# Patient Record
(demographics unavailable — no encounter records)

---

## 2024-11-12 NOTE — CARDIOLOGY SUMMARY
[TextEntry] : EKG 5/9/24: NSR, RBBB EKG 11/12/24: NSR, RBBB  TTE 5/2024: 1. Left ventricular systolic function is normal with an ejection fraction of 61 % by Pierce's method of disks. 2. Normal left ventricular internal dimensions and wall thicknesses. A false tendon is noted in the LV cavity. 3. Normal right ventricular cavity size and normal systolic function. 6. Prolapse of the posterior mitral leaflet. 7. Mild mitral regurgitation. 8. Mild to moderate aortic regurgitation. 9. Mild tricuspid regurgitation. 10. Estimated pulmonary artery systolic pressure is 27 mmHg, consistent with normal pulmonary artery pressure. 11. Small pericardial effusion (adjacent to the RA) with evidence of right atrial collapse in systole.  TTE 6/2024: 1. Left ventricular systolic function is normal. 2. Trace to small pericardial effusion adjacent to the right atrium and right ventricle. with no evidence of hemodynamic compromise (or echocardiographic evidence of cardiac tamponade).

## 2024-11-12 NOTE — PHYSICAL EXAM
[Well Developed] : well developed [Well Nourished] : well nourished [No Acute Distress] : no acute distress [Normal Conjunctiva] : normal conjunctiva [Normal Venous Pressure] : normal venous pressure [No Carotid Bruit] : no carotid bruit [Normal S1, S2] : normal S1, S2 [No Rub] : no rub [No Gallop] : no gallop [Clear Lung Fields] : clear lung fields [Good Air Entry] : good air entry [No Respiratory Distress] : no respiratory distress  [Soft] : abdomen soft [Non Tender] : non-tender [No Masses/organomegaly] : no masses/organomegaly [Normal Bowel Sounds] : normal bowel sounds [Normal Gait] : normal gait [No Edema] : no edema [No Cyanosis] : no cyanosis [No Clubbing] : no clubbing [No Varicosities] : no varicosities [No Rash] : no rash [No Skin Lesions] : no skin lesions [Moves all extremities] : moves all extremities [No Focal Deficits] : no focal deficits [Normal Speech] : normal speech [Alert and Oriented] : alert and oriented [Normal memory] : normal memory [de-identified] : 1/6 systolic murmur

## 2024-11-12 NOTE — DISCUSSION/SUMMARY
[EKG obtained to assist in diagnosis and management of assessed problem(s)] : EKG obtained to assist in diagnosis and management of assessed problem(s) [FreeTextEntry1] : Alcira is a 78yoF PMH pre-DM (a1c 5.6%), RBBB who presents for follow up  She is doing well from a cardiac standpoint.  She has intermittent lightheadedness while taking showers. I have strongly encouraged her to increase her hydration.  Her BP remains well controlled off anti hypertensives.   She has a known small pericardial effusion without evidence of hemodynamic instability. A repeat TTE will be done in 2025 and we will consider carotid dopplers at that time as well.   Her Pre DM is very well controlled with diet alone.   To follow up in 6 months.

## 2024-11-12 NOTE — REVIEW OF SYSTEMS
[Syncope] : syncope [Negative] : Heme/Lymph [SOB] : no shortness of breath [Chest Discomfort] : no chest discomfort [FreeTextEntry5] : dizziness

## 2024-11-12 NOTE — HISTORY OF PRESENT ILLNESS
[FreeTextEntry1] : Alcira is a 78yoF PMH pre-DM (a1c 5.6%) who presents for follow up  She was initially seen in May of 2024 for dizziness and an abnormal EKG  She goes on the gym, goes on the treadmill and she has no symptoms when doing so. she can walk 2 miles without any issue she does complain of a type of dizziness and vertigo that occurs when she moves her head up and down Felt another pre syncopal event during a hot shower   Lipids 4/2024:  HDL 88 LDL-c 80

## 2025-04-22 NOTE — HISTORY OF PRESENT ILLNESS
[FreeTextEntry1] : Here for CPE Overall feeling well other than chronic left knee pain [de-identified] : Never a smoker. No alcohol. Last mammogram 2 months ago. Last GYN check-up one year ago. Exercises 3 times per week.

## 2025-04-22 NOTE — ASSESSMENT
[FreeTextEntry1] : Overall feeling well Chronic left knee pain [Vaccines Reviewed] : Immunizations reviewed today. Please see immunization details in the vaccine log within the immunization flowsheet.

## 2025-05-06 NOTE — HISTORY OF PRESENT ILLNESS
[FreeTextEntry1] : Alcira is a 78yoF PMH pre-DM (a1c 5.8%), RBBB who presents for follow up  She was last seen in November of 2024 for follow up, she was feeling No episodes of syncope She is much less stressed   She was initially seen in May of 2024 for dizziness and an abnormal EKG  She goes on the gym, goes on the treadmill and she has no symptoms when doing so. she can walk 2 miles without any issue she does complain of a type of dizziness and vertigo that occurs when she moves her head up and down Felt another pre syncopal event during a hot shower  Lipids 4/2025: TG 38  HDL 78 LDL-c 56

## 2025-05-06 NOTE — PHYSICAL EXAM
[Well Developed] : well developed [Well Nourished] : well nourished [No Acute Distress] : no acute distress [Normal Conjunctiva] : normal conjunctiva [Normal Venous Pressure] : normal venous pressure [No Carotid Bruit] : no carotid bruit [Normal S1, S2] : normal S1, S2 [No Rub] : no rub [No Gallop] : no gallop [Clear Lung Fields] : clear lung fields [Good Air Entry] : good air entry [No Respiratory Distress] : no respiratory distress  [Soft] : abdomen soft [Non Tender] : non-tender [No Masses/organomegaly] : no masses/organomegaly [Normal Bowel Sounds] : normal bowel sounds [Normal Gait] : normal gait [No Edema] : no edema [No Cyanosis] : no cyanosis [No Clubbing] : no clubbing [No Varicosities] : no varicosities [No Rash] : no rash [No Skin Lesions] : no skin lesions [Moves all extremities] : moves all extremities [No Focal Deficits] : no focal deficits [Normal Speech] : normal speech [Alert and Oriented] : alert and oriented [Normal memory] : normal memory [de-identified] : 1/6 systolic murmur

## 2025-05-06 NOTE — CARDIOLOGY SUMMARY
[TextEntry] : EKG 5/9/24: NSR, RBBB EKG 11/12/24: NSR, RBBB EKG 5/6/25: NSR  TTE 5/2024: 1. Left ventricular systolic function is normal with an ejection fraction of 61 % by Pierce's method of disks. 2. Normal left ventricular internal dimensions and wall thicknesses. A false tendon is noted in the LV cavity. 3. Normal right ventricular cavity size and normal systolic function. 6. Prolapse of the posterior mitral leaflet. 7. Mild mitral regurgitation. 8. Mild to moderate aortic regurgitation. 9. Mild tricuspid regurgitation. 10. Estimated pulmonary artery systolic pressure is 27 mmHg, consistent with normal pulmonary artery pressure. 11. Small pericardial effusion (adjacent to the RA) with evidence of right atrial collapse in systole.  TTE 6/2024: 1. Left ventricular systolic function is normal. 2. Trace to small pericardial effusion adjacent to the right atrium and right ventricle. with no evidence of hemodynamic compromise (or echocardiographic evidence of cardiac tamponade).

## 2025-05-06 NOTE — DISCUSSION/SUMMARY
[EKG obtained to assist in diagnosis and management of assessed problem(s)] : EKG obtained to assist in diagnosis and management of assessed problem(s) [FreeTextEntry1] : Alcira is a 78yoF PMH pre-DM (a1c 5.8%), RBBB, mild-mod AI who presents for follow up  She is doing well from a cardiac standpoint.  Her BP remains well controlled off anti hypertensives.   She has a known small pericardial effusion without evidence of hemodynamic instability. A repeat TTE will be done now. Her previous TTE showed mild-mod AI as well, will follow this up on repeat TTE.   Her Pre DM is very well controlled with diet alone.  To follow up in 6 months.

## 2025-07-30 NOTE — HISTORY OF PRESENT ILLNESS
[FreeTextEntry8] :  Pt. is a 80 y/o female presenting for acute visit. Pt reports UTI with burning sensation and increased frequency. No back pain, no hematuria. This has been ongoing for the last few weeks. Pt was previously put on Cephalexin and Nitrofurantoin by urgent care.

## 2025-07-30 NOTE — ADDENDUM
[FreeTextEntry1] : Documented by Kraig Díaz acting as a scribe under Dr. Koch. 7/30/25       All medical record entries made by the Scribe were at my, Dr. Kenan Koch, direction and   personally dictated by me on 7/30/25. I have reviewed the chart and agree that the record   accurately reflects my personal performance of the history, physical exam, assessment and   plan. I have also personally directed, reviewed, and agreed with the chart.

## 2025-07-30 NOTE — END OF VISIT
[FreeTextEntry2] : I, Kraig Díaz, am scribing for and in the presence of Dr. Koch in the following sections HISTORY OF PRESENT ILLNESS, PAST MEDICAL/FAMILY/SOCIAL HISTORY; REVIEW OF SYSTEMS; VITAL SIGNS; PHYSICAL EXAM; ASSESSMENT/PLAN.       I personally performed the services described in the documentation, reviewed the documentation recorded by the scribe in my presence, and it accurately and completely records my words and actions.